# Patient Record
Sex: FEMALE | Race: WHITE | URBAN - METROPOLITAN AREA
[De-identification: names, ages, dates, MRNs, and addresses within clinical notes are randomized per-mention and may not be internally consistent; named-entity substitution may affect disease eponyms.]

---

## 2023-12-15 ENCOUNTER — APPOINTMENT (OUTPATIENT)
Age: 48
Setting detail: DERMATOLOGY
End: 2023-12-15

## 2023-12-15 VITALS — TEMPERATURE: 97.1 F

## 2023-12-15 DIAGNOSIS — D18.0 HEMANGIOMA: ICD-10-CM

## 2023-12-15 DIAGNOSIS — Z80.8 FAMILY HISTORY OF MALIGNANT NEOPLASM OF OTHER ORGANS OR SYSTEMS: ICD-10-CM

## 2023-12-15 PROBLEM — D18.01 HEMANGIOMA OF SKIN AND SUBCUTANEOUS TISSUE: Status: ACTIVE | Noted: 2023-12-15

## 2023-12-15 PROBLEM — D48.5 NEOPLASM OF UNCERTAIN BEHAVIOR OF SKIN: Status: ACTIVE | Noted: 2023-12-15

## 2023-12-15 PROCEDURE — OTHER RECOMMENDATIONS: OTHER

## 2023-12-15 PROCEDURE — 11102 TANGNTL BX SKIN SINGLE LES: CPT

## 2023-12-15 PROCEDURE — OTHER SEPARATE AND IDENTIFIABLE DOCUMENTATION: OTHER

## 2023-12-15 PROCEDURE — 99204 OFFICE O/P NEW MOD 45 MIN: CPT | Mod: 25

## 2023-12-15 PROCEDURE — OTHER MIPS QUALITY: OTHER

## 2023-12-15 PROCEDURE — OTHER COUNSELING: OTHER

## 2023-12-15 PROCEDURE — OTHER BIOPSY BY SHAVE METHOD: OTHER

## 2023-12-15 ASSESSMENT — LOCATION ZONE DERM: LOCATION ZONE: ARM

## 2023-12-15 ASSESSMENT — LOCATION SIMPLE DESCRIPTION DERM: LOCATION SIMPLE: LEFT POSTERIOR UPPER ARM

## 2023-12-15 ASSESSMENT — LOCATION DETAILED DESCRIPTION DERM: LOCATION DETAILED: LEFT DISTAL POSTERIOR UPPER ARM

## 2023-12-15 NOTE — PROCEDURE: BIOPSY BY SHAVE METHOD
Biopsy Method: Dermablade
Validate Anticipated Plan: No
Hemostasis: Aluminum Chloride
Billing Type: Third-Party Bill
Anesthesia Type: 1% lidocaine with epinephrine
Electrodesiccation Text: The wound bed was treated with electrodesiccation after the biopsy was performed.
Information: Selecting Yes will display possible errors in your note based on the variables you have selected. This validation is only offered as a suggestion for you. PLEASE NOTE THAT THE VALIDATION TEXT WILL BE REMOVED WHEN YOU FINALIZE YOUR NOTE. IF YOU WANT TO FAX A PRELIMINARY NOTE YOU WILL NEED TO TOGGLE THIS TO 'NO' IF YOU DO NOT WANT IT IN YOUR FAXED NOTE.
Silver Nitrate Text: The wound bed was treated with silver nitrate after the biopsy was performed.
Biopsy Type: H and E
Type Of Destruction Used: Curettage
X Size Of Lesion In Cm: 0
Notification Instructions: Patient will be notified of biopsy results. However, patient instructed to call the office if not contacted within 2 weeks.
Wound Care: Aquaphor
Electrodesiccation And Curettage Text: The wound bed was treated with electrodesiccation and curettage after the biopsy was performed.
Post-Care Instructions: I reviewed with the patient in detail post-care instructions. Patient is to keep the biopsy site dry overnight, and then apply Aquaphor twice daily until healed.
Depth Of Biopsy: dermis
Curettage Text: The wound bed was treated with curettage after the biopsy was performed.
Render Post-Care Instructions In Note?: yes
Cryotherapy Text: The wound bed was treated with cryotherapy after the biopsy was performed.
Anesthesia Volume In Cc: 1
Consent: Written consent was obtained and risks were reviewed including but not limited to scarring, infection, bleeding, scabbing, incomplete removal, nerve damage and allergy to anesthesia.
Dressing: Band-Aid
Detail Level: Detailed
Size Of Lesion In Cm: 0.3

## 2023-12-15 NOTE — PROCEDURE: RECOMMENDATIONS
Detail Level: Zone
Recommendation Preamble: The following recommendations were made during the visit: biopsy
Patient Management Risk Assessment: Moderate
Render Risk Assessment In Note?: yes

## 2024-01-26 ENCOUNTER — APPOINTMENT (OUTPATIENT)
Age: 49
Setting detail: DERMATOLOGY
End: 2024-01-26

## 2024-01-26 DIAGNOSIS — L57.0 ACTINIC KERATOSIS: ICD-10-CM

## 2024-01-26 PROCEDURE — OTHER ADDITIONAL NOTES: OTHER

## 2024-01-26 PROCEDURE — 17000 DESTRUCT PREMALG LESION: CPT

## 2024-01-26 PROCEDURE — OTHER PATHOLOGY DISCUSSION: OTHER

## 2024-01-26 PROCEDURE — OTHER PHOTO-DOCUMENTATION: OTHER

## 2024-01-26 PROCEDURE — OTHER COUNSELING: OTHER

## 2024-01-26 PROCEDURE — OTHER MIPS QUALITY: OTHER

## 2024-01-26 PROCEDURE — OTHER LIQUID NITROGEN: OTHER

## 2024-01-26 PROCEDURE — 99213 OFFICE O/P EST LOW 20 MIN: CPT | Mod: 25

## 2024-01-26 PROCEDURE — OTHER SEPARATE AND IDENTIFIABLE DOCUMENTATION: OTHER

## 2024-01-26 ASSESSMENT — LOCATION DETAILED DESCRIPTION DERM: LOCATION DETAILED: NASAL SUPRATIP

## 2024-01-26 ASSESSMENT — LOCATION ZONE DERM: LOCATION ZONE: NOSE

## 2024-01-26 ASSESSMENT — LOCATION SIMPLE DESCRIPTION DERM: LOCATION SIMPLE: NOSE

## 2024-01-26 NOTE — PROCEDURE: LIQUID NITROGEN
Post-Care Instructions: I reviewed with the patient in detail post-care instructions. Patient is to wear sunprotection, and avoid picking at any of the treated lesions. Pt may apply Vaseline or Aquaphor to crusted or scabbing areas.
Duration Of Freeze Thaw-Cycle (Seconds): 3
Consent: The patient's consent was obtained including but not limited to risks of crusting, scabbing, blistering, scarring, darker or lighter pigmentary change, recurrence, incomplete removal and infection.
Detail Level: Detailed
Show Applicator Variable?: Yes
Number Of Freeze-Thaw Cycles: 3 freeze-thaw cycles
Render Note In Bullet Format When Appropriate: No

## 2024-01-26 NOTE — PROCEDURE: ADDITIONAL NOTES
Render Risk Assessment In Note?: no
Detail Level: Simple
Additional Notes: Discussed with patient that even with LN2 there is a slight possibility of an indented scar similar to the biopsy though it is less likely. Patient understands the risk and is fine to proceed with LN2.

## 2024-02-28 ENCOUNTER — APPOINTMENT (OUTPATIENT)
Age: 49
Setting detail: DERMATOLOGY
End: 2024-02-28

## 2024-02-28 DIAGNOSIS — L82.1 OTHER SEBORRHEIC KERATOSIS: ICD-10-CM

## 2024-02-28 DIAGNOSIS — L81.4 OTHER MELANIN HYPERPIGMENTATION: ICD-10-CM

## 2024-02-28 DIAGNOSIS — L57.0 ACTINIC KERATOSIS: ICD-10-CM

## 2024-02-28 DIAGNOSIS — D22 MELANOCYTIC NEVI: ICD-10-CM

## 2024-02-28 DIAGNOSIS — D485 NEOPLASM OF UNCERTAIN BEHAVIOR OF SKIN: ICD-10-CM

## 2024-02-28 DIAGNOSIS — L57.8 OTHER SKIN CHANGES DUE TO CHRONIC EXPOSURE TO NONIONIZING RADIATION: ICD-10-CM

## 2024-02-28 DIAGNOSIS — L91.0 HYPERTROPHIC SCAR: ICD-10-CM

## 2024-02-28 DIAGNOSIS — Z71.89 OTHER SPECIFIED COUNSELING: ICD-10-CM

## 2024-02-28 PROBLEM — D22.9 MELANOCYTIC NEVI, UNSPECIFIED: Status: ACTIVE | Noted: 2024-02-28

## 2024-02-28 PROBLEM — D48.5 NEOPLASM OF UNCERTAIN BEHAVIOR OF SKIN: Status: ACTIVE | Noted: 2024-02-28

## 2024-02-28 PROCEDURE — OTHER SUNSCREEN RECOMMENDATIONS: OTHER

## 2024-02-28 PROCEDURE — 99213 OFFICE O/P EST LOW 20 MIN: CPT

## 2024-02-28 PROCEDURE — OTHER COUNSELING: OTHER

## 2024-02-28 PROCEDURE — OTHER TREATMENT REGIMEN: OTHER

## 2024-02-28 PROCEDURE — OTHER ADDITIONAL NOTES: OTHER

## 2024-02-28 ASSESSMENT — LOCATION ZONE DERM
LOCATION ZONE: LEG
LOCATION ZONE: NOSE
LOCATION ZONE: TRUNK

## 2024-02-28 ASSESSMENT — LOCATION SIMPLE DESCRIPTION DERM
LOCATION SIMPLE: LEFT UPPER BACK
LOCATION SIMPLE: NOSE
LOCATION SIMPLE: LEFT PRETIBIAL REGION

## 2024-02-28 ASSESSMENT — LOCATION DETAILED DESCRIPTION DERM
LOCATION DETAILED: LEFT DISTAL PRETIBIAL REGION
LOCATION DETAILED: NASAL SUPRATIP
LOCATION DETAILED: LEFT SUPERIOR UPPER BACK

## 2024-02-28 NOTE — PROCEDURE: TREATMENT REGIMEN
Plan: Advised pt to f/u if no improvement
Detail Level: Zone
Initiate Treatment: Aquaphor- apply to affected area bid x1 month

## 2024-02-28 NOTE — PROCEDURE: ADDITIONAL NOTES
Render Risk Assessment In Note?: no
Additional Notes: Secondary to falling on glass.
Detail Level: Simple
Additional Notes: Patient is pleased with previous biopsy and LN2 as it didn't leave a scar.